# Patient Record
Sex: MALE | Race: OTHER | NOT HISPANIC OR LATINO | Employment: OTHER | URBAN - METROPOLITAN AREA
[De-identification: names, ages, dates, MRNs, and addresses within clinical notes are randomized per-mention and may not be internally consistent; named-entity substitution may affect disease eponyms.]

---

## 2020-04-16 ENCOUNTER — NEW PATIENT (OUTPATIENT)
Dept: URBAN - METROPOLITAN AREA CLINIC 39 | Facility: CLINIC | Age: 85
End: 2020-04-16

## 2020-04-16 DIAGNOSIS — H25.812: ICD-10-CM

## 2020-04-16 DIAGNOSIS — H53.2: ICD-10-CM

## 2020-04-16 DIAGNOSIS — H40.033: ICD-10-CM

## 2020-04-16 DIAGNOSIS — H52.203: ICD-10-CM

## 2020-04-16 DIAGNOSIS — H49.11: ICD-10-CM

## 2020-04-16 DIAGNOSIS — H52.4: ICD-10-CM

## 2020-04-16 DIAGNOSIS — H25.811: ICD-10-CM

## 2020-04-16 PROCEDURE — 92015 DETERMINE REFRACTIVE STATE: CPT

## 2020-04-16 PROCEDURE — 99204 OFFICE O/P NEW MOD 45 MIN: CPT

## 2020-04-16 ASSESSMENT — VISUAL ACUITY
OD_PH: 20/40-2
OD_SC: 20/40-2
OS_PH: 20/70-1
OD_RAM: 20/30
OD_BAT: 20/100
OS_AM: 20/25
OS_BAT: 20/100
OS_SC: J12
OD_SC: <J12
OS_SC: 20/50-2

## 2020-04-16 ASSESSMENT — TONOMETRY
OD_IOP_MMHG: 15
OS_IOP_MMHG: 14

## 2020-04-23 ENCOUNTER — FOLLOW UP (OUTPATIENT)
Dept: URBAN - METROPOLITAN AREA CLINIC 39 | Facility: CLINIC | Age: 85
End: 2020-04-23

## 2020-04-23 DIAGNOSIS — H52.4: ICD-10-CM

## 2020-04-23 DIAGNOSIS — H25.812: ICD-10-CM

## 2020-04-23 DIAGNOSIS — H53.2: ICD-10-CM

## 2020-04-23 DIAGNOSIS — H52.203: ICD-10-CM

## 2020-04-23 PROCEDURE — 92015 DETERMINE REFRACTIVE STATE: CPT

## 2020-04-23 PROCEDURE — 92012 INTRM OPH EXAM EST PATIENT: CPT

## 2020-04-23 ASSESSMENT — TONOMETRY
OS_IOP_MMHG: 12
OD_IOP_MMHG: 13

## 2020-04-23 ASSESSMENT — VISUAL ACUITY
OS_SC: 20/60+1
OD_SC: 20/70-2

## 2020-05-07 NOTE — PATIENT DISCUSSION
"5/7/2020:  Despite some risk factors, the patient does not demonstrate definitive evidence of glaucoma at this time.  needs HVF has had in past will do in near future.  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  get disc photo today OU. "

## 2020-05-12 ENCOUNTER — CATARACT CONSULT (OUTPATIENT)
Dept: URBAN - METROPOLITAN AREA CLINIC 43 | Facility: CLINIC | Age: 85
End: 2020-05-12

## 2020-05-12 DIAGNOSIS — H04.123: ICD-10-CM

## 2020-05-12 DIAGNOSIS — G70.00: ICD-10-CM

## 2020-05-12 DIAGNOSIS — H40.033: ICD-10-CM

## 2020-05-12 DIAGNOSIS — H53.2: ICD-10-CM

## 2020-05-12 DIAGNOSIS — H25.812: ICD-10-CM

## 2020-05-12 DIAGNOSIS — Z97.3: ICD-10-CM

## 2020-05-12 DIAGNOSIS — H25.811: ICD-10-CM

## 2020-05-12 DIAGNOSIS — H18.59: ICD-10-CM

## 2020-05-12 PROCEDURE — 92136TC INTERFEROMETRY - TECHNICAL COMPONENT

## 2020-05-12 PROCEDURE — 92025-3 CORNEAL TOPO, REFUSED

## 2020-05-12 PROCEDURE — 92012 INTRM OPH EXAM EST PATIENT: CPT

## 2020-05-12 PROCEDURE — V2799PMN IMPRIMIS PRED-MOXI-NEPAF 5ML

## 2020-05-12 PROCEDURE — 92286 ANT SGM IMG I&R SPECLR MIC: CPT

## 2020-05-12 ASSESSMENT — VISUAL ACUITY
OD_CC: 20/60
OD_CC: J3
OD_BAT: <20/400
OS_BAT: <20/400
OS_SC: <J12
OD_SC: 20/200
OS_AM: 20/20-1
OS_CC: 20/80-1
OD_RAM: 20/20
OD_SC: <J12
OS_SC: 20/60
OS_CC: J2

## 2020-05-12 ASSESSMENT — TONOMETRY
OS_IOP_MMHG: 14
OD_IOP_MMHG: 14

## 2020-05-18 NOTE — PATIENT DISCUSSION
5/7/2020:  CTL trials given.  try unconv monoV with OS lens only will still be able to read with OD near SC.  Also has -1.25 Oas[employer] Transitions lens so will try DANNY OU with CTL but ed WILL need NVO with this set-up.

## 2020-05-19 ENCOUNTER — PRE-OP/H&P (OUTPATIENT)
Dept: URBAN - METROPOLITAN AREA CLINIC 39 | Facility: CLINIC | Age: 85
End: 2020-05-19

## 2020-05-19 ENCOUNTER — SURGERY/PROCEDURE (OUTPATIENT)
Dept: URBAN - METROPOLITAN AREA CLINIC 43 | Facility: CLINIC | Age: 85
End: 2020-05-19

## 2020-05-19 DIAGNOSIS — H53.2: ICD-10-CM

## 2020-05-19 DIAGNOSIS — H25.811: ICD-10-CM

## 2020-05-19 PROCEDURE — 66984 XCAPSL CTRC RMVL W/O ECP: CPT

## 2020-05-19 PROCEDURE — 99211T TECH SERVICE

## 2020-05-20 ENCOUNTER — POST OP/EVAL OF SECOND EYE (OUTPATIENT)
Dept: URBAN - METROPOLITAN AREA CLINIC 43 | Facility: CLINIC | Age: 85
End: 2020-05-20

## 2020-05-20 DIAGNOSIS — H25.812: ICD-10-CM

## 2020-05-20 DIAGNOSIS — Z96.1: ICD-10-CM

## 2020-05-20 PROCEDURE — 99024 POSTOP FOLLOW-UP VISIT: CPT

## 2020-05-20 PROCEDURE — 92012 INTRM OPH EXAM EST PATIENT: CPT

## 2020-05-20 ASSESSMENT — VISUAL ACUITY
OD_SC: 20/40
OS_CC: 20/400

## 2020-05-20 ASSESSMENT — TONOMETRY: OD_IOP_MMHG: 14

## 2020-06-01 ENCOUNTER — TECH ONLY (OUTPATIENT)
Dept: URBAN - METROPOLITAN AREA CLINIC 39 | Facility: CLINIC | Age: 85
End: 2020-06-01

## 2020-06-01 ENCOUNTER — SURGERY/PROCEDURE (OUTPATIENT)
Dept: URBAN - METROPOLITAN AREA CLINIC 43 | Facility: CLINIC | Age: 85
End: 2020-06-01

## 2020-06-01 DIAGNOSIS — Z96.1: ICD-10-CM

## 2020-06-01 DIAGNOSIS — H25.812: ICD-10-CM

## 2020-06-01 DIAGNOSIS — H40.033: ICD-10-CM

## 2020-06-01 DIAGNOSIS — H53.2: ICD-10-CM

## 2020-06-01 PROCEDURE — 66984 XCAPSL CTRC RMVL W/O ECP: CPT

## 2020-06-01 PROCEDURE — 99211T TECH SERVICE

## 2020-06-01 ASSESSMENT — VISUAL ACUITY
OS_CC: 20/80-1
OS_SC: J<12
OS_SC: 20/60
OS_BAT: 20/<400
OS_CC: J2
OD_SC: 20/25-1

## 2020-06-01 ASSESSMENT — TONOMETRY
OD_IOP_MMHG: 14
OS_IOP_MMHG: 14

## 2020-06-02 ENCOUNTER — 1 DAY POST-OP (OUTPATIENT)
Dept: URBAN - METROPOLITAN AREA CLINIC 43 | Facility: CLINIC | Age: 85
End: 2020-06-02

## 2020-06-02 DIAGNOSIS — Z96.1: ICD-10-CM

## 2020-06-02 PROCEDURE — 99024 POSTOP FOLLOW-UP VISIT: CPT

## 2020-06-02 ASSESSMENT — VISUAL ACUITY
OS_SC: 20/80-1
OS_PH: 20/60-2
OD_SC: 20/25-3

## 2020-06-02 ASSESSMENT — TONOMETRY
OD_IOP_MMHG: 14
OS_IOP_MMHG: 14

## 2020-06-23 ENCOUNTER — POST-OP CATARACT (OUTPATIENT)
Dept: URBAN - METROPOLITAN AREA CLINIC 43 | Facility: CLINIC | Age: 85
End: 2020-06-23

## 2020-06-23 DIAGNOSIS — Z96.1: ICD-10-CM

## 2020-06-23 PROCEDURE — 99024 POSTOP FOLLOW-UP VISIT: CPT

## 2020-06-23 ASSESSMENT — VISUAL ACUITY
OS_SC: J8+
OD_SC: J8
OS_CC: J2
OS_SC: 20/40-1
OD_SC: 20/20-3

## 2020-06-23 ASSESSMENT — TONOMETRY
OS_IOP_MMHG: 14
OD_IOP_MMHG: 14

## 2020-10-07 ENCOUNTER — ESTABLISHED COMPREHENSIVE EXAM (OUTPATIENT)
Dept: URBAN - METROPOLITAN AREA CLINIC 43 | Facility: CLINIC | Age: 85
End: 2020-10-07

## 2020-10-07 DIAGNOSIS — H18.593: ICD-10-CM

## 2020-10-07 DIAGNOSIS — H52.4: ICD-10-CM

## 2020-10-07 DIAGNOSIS — H04.123: ICD-10-CM

## 2020-10-07 DIAGNOSIS — H52.203: ICD-10-CM

## 2020-10-07 PROCEDURE — 92015 DETERMINE REFRACTIVE STATE: CPT

## 2020-10-07 PROCEDURE — 92014 COMPRE OPH EXAM EST PT 1/>: CPT

## 2020-10-07 ASSESSMENT — VISUAL ACUITY
OS_SC: J12
OD_SC: 20/20-1
OD_SC: J10
OS_SC: 20/25

## 2020-10-07 ASSESSMENT — TONOMETRY
OD_IOP_MMHG: 12
OS_IOP_MMHG: 12

## 2021-02-08 NOTE — PATIENT DISCUSSION
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  will need to update HVF in a few months.  monitor closely due to [facility] and strong FHx.

## 2021-02-15 NOTE — PATIENT DISCUSSION
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  will need to update HVF in a few months.  monitor closely due to [facility] and strong FHx.

## 2021-04-05 NOTE — PATIENT DISCUSSION
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  will need to update HVF in a few months.  monitor closely due to [facility] and strong FHx.

## 2021-05-17 NOTE — PATIENT DISCUSSION
5/17/21:.  stable HVF and IOP.   inc PBS only.  follow due to [facility] and [facility].

## 2021-05-17 NOTE — PATIENT DISCUSSION
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  will need to update HVF in a few months.  monitor closely due to [facility] and strong FHx.

## 2021-10-06 ENCOUNTER — ESTABLISHED COMPREHENSIVE EXAM (OUTPATIENT)
Dept: URBAN - METROPOLITAN AREA CLINIC 43 | Facility: CLINIC | Age: 86
End: 2021-10-06

## 2021-10-06 DIAGNOSIS — H18.593: ICD-10-CM

## 2021-10-06 DIAGNOSIS — D31.32: ICD-10-CM

## 2021-10-06 DIAGNOSIS — H04.123: ICD-10-CM

## 2021-10-06 DIAGNOSIS — H52.203: ICD-10-CM

## 2021-10-06 PROCEDURE — 92014 COMPRE OPH EXAM EST PT 1/>: CPT

## 2021-10-06 PROCEDURE — 92015 DETERMINE REFRACTIVE STATE: CPT

## 2021-10-06 ASSESSMENT — VISUAL ACUITY
OS_CC: J1-
OS_SC: 20/40-2
OS_CC: 20/400
OS_SC: J12
OD_SC: 20/20-2
OD_SC: J12

## 2021-10-06 ASSESSMENT — TONOMETRY
OD_IOP_MMHG: 12
OS_IOP_MMHG: 12

## 2021-11-15 NOTE — PATIENT DISCUSSION
11/15/21:in office today  noticeable difference in light sensitivity OS>OD dilated with KMS. Bring back next available for [facility] OCT, check pupils & check color vision.

## 2021-11-15 NOTE — PATIENT DISCUSSION
5/17/21:.  stable HVF and IOP.   inc PBS only.  follow due to [facility] and [facility] of [facility].

## 2021-11-15 NOTE — PATIENT DISCUSSION
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  will need to update HVF in a few months.  monitor closely due to [facility] and strong FHx.

## 2021-11-15 NOTE — PATIENT DISCUSSION
Discussed risks/benefits and alternatives to surgery.  mild at this point.  try Upneeq samples given today.

## 2022-02-02 NOTE — PATIENT DISCUSSION
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  will need to update HVF in a few months.  monitor closely due to [facility] and strong FHx.

## 2022-02-02 NOTE — PATIENT DISCUSSION
11/15/21:in office today  noticeable difference in light sensitivity OS>OD dilated with KMS. Bring back next available for [facility] OCT, check pupils & check color vision.

## 2022-02-02 NOTE — PATIENT DISCUSSION
5/17/21:.  stable HVF and IOP.   inc PBS only.  follow due to [facility] and [facility] of [facility].

## 2022-03-04 NOTE — PATIENT DISCUSSION
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 IOP normotensive today, RTC for updated OCT and HVF.

## 2022-03-04 NOTE — PATIENT DISCUSSION
Discussed risks/benefits and alternatives to surgery.  mild at this point.  GIven Upneeq samples given previously.

## 2022-03-04 NOTE — PATIENT DISCUSSION
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  will need to update HVF in a few months.  monitor closely due to [facility] and strong FHx.

## 2022-03-04 NOTE — PATIENT DISCUSSION
5/17/21:.  stable HVF and IOP.   inc PBS only.  follow due to [facility] and [facility].

## 2022-04-05 NOTE — PATIENT DISCUSSION
I have discussed with the patient the option of blepharoplasty surgery to improve the functional visual field. None

## 2022-05-19 NOTE — PATIENT DISCUSSION
OCT stable to last RNFL done with KMS, borderline NFL OD, OS NFL WNL. Borderline [facility] OU with scattered defects. Continue to monitor for changes. Gone until fall, RTC for HVF.

## 2022-05-19 NOTE — PATIENT DISCUSSION
5/17/21:.  stable HVF and IOP.   inc PBS only.  follow due to [facility] and [facility].

## 2022-05-19 NOTE — PATIENT DISCUSSION
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  will need to update HVF in a few months.  monitor closely due to [facility] and strong FHx.

## 2022-10-07 ENCOUNTER — ESTABLISHED PATIENT (OUTPATIENT)
Dept: URBAN - METROPOLITAN AREA CLINIC 43 | Facility: CLINIC | Age: 87
End: 2022-10-07

## 2022-10-07 DIAGNOSIS — H18.593: ICD-10-CM

## 2022-10-07 DIAGNOSIS — D31.32: ICD-10-CM

## 2022-10-07 DIAGNOSIS — H04.123: ICD-10-CM

## 2022-10-07 DIAGNOSIS — Z97.3: ICD-10-CM

## 2022-10-07 DIAGNOSIS — H52.4: ICD-10-CM

## 2022-10-07 PROCEDURE — 92250 FUNDUS PHOTOGRAPHY W/I&R: CPT

## 2022-10-07 PROCEDURE — 92310-1 LEVEL 1 CONTACT LENS MANAGEMENT

## 2022-10-07 PROCEDURE — 99199RSP RESIDENT SUPERVISED BY PROVIDER

## 2022-10-07 PROCEDURE — 92014 COMPRE OPH EXAM EST PT 1/>: CPT

## 2022-10-07 PROCEDURE — 92015 DETERMINE REFRACTIVE STATE: CPT

## 2022-10-07 ASSESSMENT — TONOMETRY
OS_IOP_MMHG: 12
OD_IOP_MMHG: 12

## 2022-10-07 ASSESSMENT — VISUAL ACUITY
OD_SC: 20/25+2
OD_CC: J12

## 2022-12-21 NOTE — PATIENT DISCUSSION
OCT stable to last RNFL done with KMS, borderline NFL OD, OS NFL WNL. Borderline [address] [facility] OU with scattered defects. Continue to monitor for changes. Gone until fall, RTC for HVF.

## 2022-12-21 NOTE — PATIENT DISCUSSION
5/17/21:.  stable HVF and IOP.   inc PBS only.  follow due to [facility] and [facility].

## 2022-12-21 NOTE — PATIENT DISCUSSION
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  will need to update HVF in a few months.  monitor closely due to [facility] and strong FHx.

## 2023-10-10 ENCOUNTER — COMPREHENSIVE EXAM (OUTPATIENT)
Dept: URBAN - METROPOLITAN AREA CLINIC 43 | Facility: CLINIC | Age: 88
End: 2023-10-10

## 2023-10-10 DIAGNOSIS — H52.203: ICD-10-CM

## 2023-10-10 DIAGNOSIS — D31.32: ICD-10-CM

## 2023-10-10 DIAGNOSIS — H18.593: ICD-10-CM

## 2023-10-10 DIAGNOSIS — H52.4: ICD-10-CM

## 2023-10-10 DIAGNOSIS — H04.123: ICD-10-CM

## 2023-10-10 PROCEDURE — 92310-1 LEVEL 1 CONTACT LENS MANAGEMENT

## 2023-10-10 PROCEDURE — 92014 COMPRE OPH EXAM EST PT 1/>: CPT

## 2023-10-10 PROCEDURE — 92015 DETERMINE REFRACTIVE STATE: CPT

## 2023-10-10 ASSESSMENT — VISUAL ACUITY
OD_SC: J10
OD_CC: J1
OS_CC: J1
OS_SC: 20/25-2
OD_SC: 20/20-1
OS_SC: J10

## 2023-10-10 ASSESSMENT — TONOMETRY
OS_IOP_MMHG: 10
OD_IOP_MMHG: 10

## 2024-10-15 ENCOUNTER — COMPREHENSIVE EXAM (OUTPATIENT)
Dept: URBAN - METROPOLITAN AREA CLINIC 43 | Facility: CLINIC | Age: 89
End: 2024-10-15

## 2024-10-15 DIAGNOSIS — D31.32: ICD-10-CM

## 2024-10-15 DIAGNOSIS — H52.203: ICD-10-CM

## 2024-10-15 DIAGNOSIS — H18.593: ICD-10-CM

## 2024-10-15 DIAGNOSIS — H04.123: ICD-10-CM

## 2024-10-15 PROCEDURE — 92014 COMPRE OPH EXAM EST PT 1/>: CPT

## 2024-10-15 PROCEDURE — 92015 DETERMINE REFRACTIVE STATE: CPT
